# Patient Record
(demographics unavailable — no encounter records)

---

## 2024-11-07 NOTE — PHYSICAL EXAM
[Well-appearing] : well-appearing [No abnormal neurocutaneous stigmata or skin lesions] : no abnormal neurocutaneous stigmata or skin lesions [Straight] : straight [No deformities] : no deformities [Alert] : alert [Well related, good eye contact] : well related, good eye contact [Normal speech and language] : normal speech and language [Follows instructions well] : follows instructions well [de-identified] : brant calderón [de-identified] : respirations appear regular and unlabored  [de-identified] : abdomen does not appear distended  [de-identified] : pupils are equal, round and reactive to light. Visual fields were intact to confrontation. Eye movements were full with no nystagmus. Funduscopic exam revealed sharp disc margins.  Facial sensation intact to touch and/or temperature. Facial strength was normal. Hearing intact to soft finger rub and/or 128 Hz tuning fork. Palate elevated symmetrically with phonation. Cephalic version and shoulder shrug exhibited normal strength. Tongue protruded in the midline.  [de-identified] : no pronator drift was noted. Normal resistance to passive manipulation was demonstrated. Muscle strength was normal both proximally and distally.  [de-identified] : casual gait was narrow based. Heel and toe walking were intact. Tandem gait was intact  [de-identified] : 2+ and symmetrical at all tested locations. No ankle clonus. Plantar responses were flexor.  [de-identified] : normal response to touch at all tested locations. Romberg negative  [de-identified] : finger to nose and heel-knee-shin movements were intact. Fast finger movements were brisk, rhythmic and symmetric

## 2024-11-07 NOTE — HISTORY OF PRESENT ILLNESS
[FreeTextEntry1] : I have had the opportunity to see your patient, TATE GUZMAN, in follow up.   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 She is not taking an antiseizure medication. No academic concerns were reported. Sleeping well. No interval history of serious illness or injuries. Forms are needed for rescue medication at school.

## 2024-11-07 NOTE — ASSESSMENT
[FreeTextEntry1] : It was my pleasure to have seen TATE GUZMAN in consultation.   Identification: 11 year female   Impression: SeLECTs    Medical decision making: Seizure free for 2 years on no antiseizure medication.    Discussion: Seizure diagnosis, prognosis, recurrence risk, provocative factors, health risks, first aid and safety precautions were reviewed. Risks and benefits of rescue medication were discussed, and all questions were answered. Mother is most comfortable with having this medication available for the remainder of the school year.   Recommendations: -Medication was refilled. -Documentation provided, letter and school form.